# Patient Record
Sex: MALE | Race: WHITE | NOT HISPANIC OR LATINO | URBAN - METROPOLITAN AREA
[De-identification: names, ages, dates, MRNs, and addresses within clinical notes are randomized per-mention and may not be internally consistent; named-entity substitution may affect disease eponyms.]

---

## 2017-02-03 ENCOUNTER — OFFICE VISIT - RIVER FALLS (OUTPATIENT)
Dept: FAMILY MEDICINE | Facility: CLINIC | Age: 57
End: 2017-02-03

## 2017-04-07 ENCOUNTER — OFFICE VISIT - RIVER FALLS (OUTPATIENT)
Dept: FAMILY MEDICINE | Facility: CLINIC | Age: 57
End: 2017-04-07

## 2017-06-20 ENCOUNTER — COMMUNICATION - RIVER FALLS (OUTPATIENT)
Dept: FAMILY MEDICINE | Facility: CLINIC | Age: 57
End: 2017-06-20

## 2017-06-20 ENCOUNTER — OFFICE VISIT - RIVER FALLS (OUTPATIENT)
Dept: FAMILY MEDICINE | Facility: CLINIC | Age: 57
End: 2017-06-20

## 2017-12-11 ENCOUNTER — AMBULATORY - RIVER FALLS (OUTPATIENT)
Dept: FAMILY MEDICINE | Facility: CLINIC | Age: 57
End: 2017-12-11

## 2017-12-12 LAB
CHOLEST SERPL-MCNC: 176 MG/DL
CHOLEST/HDLC SERPL: 1.9 {RATIO}
CREAT SERPL-MCNC: 0.89 MG/DL (ref 0.7–1.33)
GLUCOSE BLD-MCNC: 84 MG/DL (ref 65–99)
HDLC SERPL-MCNC: 92 MG/DL
LDLC SERPL CALC-MCNC: 71 MG/DL
NONHDLC SERPL-MCNC: 84 MG/DL
TRIGL SERPL-MCNC: 53 MG/DL

## 2017-12-15 ENCOUNTER — OFFICE VISIT - RIVER FALLS (OUTPATIENT)
Dept: FAMILY MEDICINE | Facility: CLINIC | Age: 57
End: 2017-12-15

## 2017-12-15 ASSESSMENT — MIFFLIN-ST. JEOR: SCORE: 1666.75

## 2018-06-15 ENCOUNTER — OFFICE VISIT - RIVER FALLS (OUTPATIENT)
Dept: FAMILY MEDICINE | Facility: CLINIC | Age: 58
End: 2018-06-15

## 2018-06-15 ASSESSMENT — MIFFLIN-ST. JEOR: SCORE: 1627.74

## 2018-06-29 ENCOUNTER — OFFICE VISIT - RIVER FALLS (OUTPATIENT)
Dept: FAMILY MEDICINE | Facility: CLINIC | Age: 58
End: 2018-06-29

## 2018-07-31 ENCOUNTER — OFFICE VISIT - RIVER FALLS (OUTPATIENT)
Dept: FAMILY MEDICINE | Facility: CLINIC | Age: 58
End: 2018-07-31

## 2018-09-13 ENCOUNTER — OFFICE VISIT - RIVER FALLS (OUTPATIENT)
Dept: FAMILY MEDICINE | Facility: CLINIC | Age: 58
End: 2018-09-13

## 2018-09-13 ASSESSMENT — MIFFLIN-ST. JEOR: SCORE: 1615.95

## 2022-02-11 VITALS
WEIGHT: 182.4 LBS | SYSTOLIC BLOOD PRESSURE: 146 MMHG | TEMPERATURE: 99.5 F | BODY MASS INDEX: 27.2 KG/M2 | SYSTOLIC BLOOD PRESSURE: 124 MMHG | HEART RATE: 76 BPM | WEIGHT: 183.6 LBS | HEIGHT: 69 IN | DIASTOLIC BLOOD PRESSURE: 84 MMHG | BODY MASS INDEX: 27.11 KG/M2 | SYSTOLIC BLOOD PRESSURE: 120 MMHG | WEIGHT: 184.2 LBS | HEART RATE: 64 BPM | BODY MASS INDEX: 27.02 KG/M2 | HEART RATE: 72 BPM | DIASTOLIC BLOOD PRESSURE: 80 MMHG | TEMPERATURE: 98.8 F | DIASTOLIC BLOOD PRESSURE: 74 MMHG | TEMPERATURE: 98.1 F

## 2022-02-12 VITALS
HEART RATE: 64 BPM | BODY MASS INDEX: 26.63 KG/M2 | DIASTOLIC BLOOD PRESSURE: 80 MMHG | HEIGHT: 69 IN | SYSTOLIC BLOOD PRESSURE: 128 MMHG | WEIGHT: 179.8 LBS | TEMPERATURE: 98.1 F

## 2022-02-12 VITALS
BODY MASS INDEX: 28.29 KG/M2 | DIASTOLIC BLOOD PRESSURE: 88 MMHG | TEMPERATURE: 97.4 F | DIASTOLIC BLOOD PRESSURE: 86 MMHG | HEIGHT: 69 IN | SYSTOLIC BLOOD PRESSURE: 142 MMHG | SYSTOLIC BLOOD PRESSURE: 161 MMHG | HEART RATE: 72 BPM | HEART RATE: 71 BPM | WEIGHT: 191 LBS

## 2022-02-15 NOTE — LETTER
(Inserted Image. Unable to display)       January 10, 2019        MARVIN ROBERTS  [address] [place], [place] [id number]      Dear MARVIN,      Thank you for selecting [facility] for your healthcare needs.     I just received a request to refill your warfarin.  I do think you need to be on warfarin due to your history of significant blood clots, but it is not safe for us to prescribe this medication if you do not come in to get your blood levels checked.  Please come in for an appointment for us to discuss this in detail!  I'm worried about you.          Please contact my practice at [phone] if you have any questions or concerns.     Sincerely,        Ibis Reynolds MD

## 2022-02-15 NOTE — PROGRESS NOTES
Patient:   MARVIN ROBERTS            MRN: [id number]            FIN: [id number]               Age:   58 years     Sex:  Male     :  1960   Associated Diagnoses:   None   Author:   Rodolfo CRANE, Ibis      Procedure   EKG procedure   Indication: chest pain.     Position: supine.     EKG findings   Interpretation: by primary care provider.     Rhythm: sinus.     Axis: normal axis, normal configuration.     Within normal limits.     Intervals: [place] normal, QRS normal, QT normal.     Normal EKG.     P waves: normal.     QRS complex: normal, Q waves present unchanged since  2016.     ST-T-U complex: normal, non-specific ST-T wave abnormalities, peaked T waves, no depressed ST segment.     Interpretation: borderline, ST-T wave abnormalities No acute ST changes, unchanged from previous EKG.     Discussed: with patient.        Impression and Plan   Orders

## 2022-02-15 NOTE — PROGRESS NOTES
Patient:   MARVIN ROBERTS            MRN: [id number]            FIN: [id number]               Age:   57 years     Sex:  Male     :  1960   Associated Diagnoses:   Anticoagulated; DVT of lower extremity, bilateral; HTN (Hypertension); Personal history of DVT (deep vein thrombosis)   Author:   Jayro Prajapati MD      Visit Information      Date of Service: 12/15/2017 02:33 pm  Performing Location: [facility]  Encounter#: [id number]      Primary Care Provider (PCP):  [id number] -UNKNOWN,    NPI# [id number]      Referring Provider:  Jayro Prajapati MD    NPI# [id number]      Chief Complaint   12/15/2017 2:38 PM CST   Patient here for Hypertension Check.        History of Present Illness   In for fu  Meds as directed  tremor controlled  Works in kitchen  Chronic edema right leg since dvts Better in am worse at night  No chest pain sob abd pain      Review of Systems   Constitutional:  Negative except as documented in history of present illness.    Ear/Nose/Mouth/Throat:  Negative.    Respiratory:  Negative.    Cardiovascular:  Negative.    Gastrointestinal:  Negative.    Genitourinary:  Negative.    Musculoskeletal:  Negative except as documented in history of present illness.    Integumentary:  Negative except as documented in history of present illness.    Neurologic:  Negative.             Health Status   Allergies:    Allergic Reactions (Selected)  No known allergies   Medications:  (Selected)   Prescriptions  Prescribed  aspirin 81 mg oral tablet: 1 tab(s) ( 81 mg ), po, daily, # 30 tab(s), 0 Refill(s), Type: Maintenance, patient has supply at home (Rx)  citalopram 20 mg oral tablet: See Instructions, Instructions: TAKE ONE TABLET BY MOUTH EVERY DAY, # 90 unknown unit, 1 Refill(s), Type: Soft Stop, Pharmacy: [facility]  losartan 25 mg oral tablet: 1 tab(s) ( 25 mg ), PO, Daily, # 90 tab(s), 3 Refill(s), Type: Maintenance, [facility], 1 tab(s) po  daily  propranolol 10 mg oral tablet: 1 tab(s) ( 10 mg ), po, bid, # 180 tab(s), 1 Refill(s), Type: Maintenance, [place] [facility] [place] tab(s) po bid  warfarin 2.5 mg oral tablet: See Instructions, Instructions: TAKE ONE TABLET BY MOUTH EVERY DAY, # 90 unknown unit, Type: Soft Stop, [place] [facility] [place]ed Medications  Documented  ferrous sulfate 325 mg (65 mg elemental iron) oral tablet: 1 tab(s) ( 325 mg ), po, bid, 0 Refill(s), Type: Maintenance   Problem list:    All Problems (Selected)  Anemia due to blood loss / SNOMED CT 0715277643 / Confirmed  Anticoagulated / ICD-9-CM V58.61 / Confirmed  Anxiety Disorder / ICD-9-.00 / Confirmed  Benign Familial Tremor / ICD-9-.1 / Confirmed  Chewing tobacco use / SNOMED CT 245873377 / Confirmed  DVT of lower extremity, bilateral / SNOMED CT 2273853443 / Confirmed  Personal history of DVT (deep vein thrombosis) / SNOMED CT 7883007093 / Confirmed  Personal history of pulmonary embolism / SNOMED CT 949384530 / Confirmed  History of gastrointestinal bleed / SNOMED CT 9622599569 / Confirmed  HTN (Hypertension) / ICD-9-.9 / Confirmed  Deep vein thrombosis of portal vein / SNOMED CT 99846920 / Confirmed  Bilateral pulmonary embolism / SNOMED CT 80854567 / Confirmed  PVT (Portal Vein Thrombosis) / ICD-9- / Confirmed      Histories   Past Medical History:    Active  Chewing tobacco use (738866850)  History of gastrointestinal bleed (1301529258)  Anemia due to blood loss (6883637080)  Resolved  *Hospitalized@[facility] - DVT of lower extremity, bilateral: Onset on 12/3/2015 at 55 years.  Resolved on 12/3/2015 at 55 years.  *Hospitalized@[facility] - Pulmonary emboli B/L: Onset on 12/3/2015 at 55 years.  Resolved on 12/5/2015 at 55 years.  Inpatient stay (059043341):  Resolved on 8/23/2016 at 56 years.  Comments:  9/12/2016 CDT 6:40 AM CDT - Yael Myers  [facility] - GI blood loss and anemia   Family History:     Cerebrovascular accident  Father (Johnathan)  Comments:  5/19/2010 3:38 PM - Halie Gomez  CVA at age 76     Procedure history:    Colonoscopy (SNOMED CT 249102859) performed by Rico Fraser MD on 8/15/2016 at 56 Years.  Esophagogastroduodenoscopy (SNOMED CT 979469851) performed by Collins Hayden MD on 1/4/2002 at 41 Years.  Tonsillectomy and adenoidectomy (SNOMED CT 511687732).  Eye surgery (SNOMED CT 7696463647).   Social History:        Alcohol Assessment: Current            Current                     Comments:                      05/19/2010 - Halie Gomez                     One case of beer/weekend      Tobacco Assessment: Current            Current                     Comments:                      05/19/2010 - Halie Gomez                     Oral tobacco        Physical Examination   Vital Signs   12/15/2017 2:38 PM CST Temperature Tympanic 97.4 DegF  LOW    Peripheral Pulse Rate 71 bpm    Pulse Site Brachial artery    HR Method Electronic    Systolic Blood Pressure 161 mmHg  HI    Diastolic Blood Pressure 88 mmHg  HI    Mean Arterial Pressure 112 mmHg    BP Site Right arm    BP Method Electronic      Measurements from flowsheet : Measurements   12/15/2017 2:38 PM CST Height Measured - Standard 69 in    Weight Measured - Standard 191.0 lb    BSA 2.05 m2    Body Mass Index 28.2 kg/m2      General:  Alert and oriented, No acute distress.    Neck:  Supple, Non-tender.    Respiratory:  Lungs are clear to auscultation, Respirations are non-labored.    Cardiovascular:  Normal rate, Regular rhythm.    Gastrointestinal:  Soft, Non-tender, No organomegaly.    Musculoskeletal:  2 pl stasis edema r leg with stasis skin changes  no tenderness.    Neurologic:  Alert, Oriented.       Impression and Plan   Diagnosis     Anticoagulated (XGW28-GT Z79.01).     DVT of lower extremity, bilateral (OMB82-VC I82.403).     HTN (Hypertension) (PBX00-YN I10).     Personal history of DVT (deep vein thrombosis)  (QJZ02-QX Z86.718).     Course:  Not progressing as expected.    Plan:  add arb  labs/bp in 1 month  6 mo fu  support stockings  Recent low inr now coumadin increased  he notes no new sxs.    Patient Instructions:       Counseled: Patient, Regarding diagnosis, Regarding treatment, Regarding medications, Diet, Activity.

## 2022-02-15 NOTE — PROGRESS NOTES
Chief Complaint    Patient is here for follow up with right leg.  History of Present Illness      rash started following his DVT several years ago, he has completed his repeat lower ext US showing bilateral extensive DVT with collaterals and recanalization.  His ABIs are normal.       tried some over the counter creams without relief, but says the non-stick gauze is helpful.      His swelling is always worse in the afternoon after he has been up for awhile.  His INR today is above his therapeutic range at 3.6.  He has been taking 2.5 mg of Coumadin daily.       no fevers, chills, sore throat, runny nose, nausea, vomiting, constipation, no new skin lesions, chest pain, palpitations, slurred speech, new paresthesia, shortness of breath or wheeze.      Review of systems is negative except as per HPI      Exam:      General: alert and oriented ×3 no acute distress.      HEENT: pupils are equal round and reactive to light extraocular motion is intact. Normocephalic and atraumatic.       Hearing is grossly normal and there is no otorrhea.       Nares are patent there is no rhinorrhea.       Mucous membranes are moist and pink.      Chest: has bilateral rise with no increased work of breathing.      Cardiovascular: normal perfusion and brisk capillary refill.      Musculoskeletal: right lower ext edema with significant venous stasis ulcers, also see images captured today.      Neuro: no gross focal abnormalities and memory seems intact.      Psychiatric: speech is clear and coherent and fluent. Patient dressed appropriately for the weather. Mood is appropriate and affect is full.      Skin:  almost circumferential ulcer near right ankle, also some additional ecchymosis front medial calf,      also see images captured         Physical Exam   Vitals & Measurements    T: 98.8   F (Tympanic)  HR: 72(Peripheral)  BP: 124/74     WT: 184.2 lb   Assessment/Plan       Anticoagulated (Z79.01)         Orders:          95027 office  outpatient visit 40 minutes (Charge), Quantity: 1, Ulcer, venous stasis  DVT of lower extremity, bilateral  Bilateral pulmonary embolism  Anticoagulated  Deep vein thrombosis of portal vein          POC Prothrombin Time with INR* ([facility]), Specimen Type: Fingerstick, Collection Date: 06/29/18 14:42:00 CDT          Referral (Request), 06/29/18 15:18:00 CDT, Referred to: Other, Referred to: Dr. NIURKA Villegas or FLAKITA Macario with [facility] phone number [phone], Bilateral pulmonary embolism  Deep vein thrombosis of portal vein  DVT of lower extremity, bilateral  Ulcer, venous...          Referral (Request), 06/29/18 15:18:00 CDT, Referred to: Hematology, Bilateral pulmonary embolism  Deep vein thrombosis of portal vein  DVT of lower extremity, bilateral  Ulcer, venous stasis  Anticoagulated                Bilateral pulmonary embolism (I26.99)         Orders:          86463 office outpatient visit 40 minutes (Charge), Quantity: 1, Ulcer, venous stasis  DVT of lower extremity, bilateral  Bilateral pulmonary embolism  Anticoagulated  Deep vein thrombosis of portal vein          Referral (Request), 06/29/18 15:18:00 CDT, Referred to: Other, Referred to: Dr. NIURKA Villegas or FLAKITA Macario with [facility] phone number [phone], Bilateral pulmonary embolism  Deep vein thrombosis of portal vein  DVT of lower extremity, bilateral  Ulcer, venous...          Referral (Request), 06/29/18 15:18:00 CDT, Referred to: Hematology, Bilateral pulmonary embolism  Deep vein thrombosis of portal vein  DVT of lower extremity, bilateral  Ulcer, venous stasis  Anticoagulated                Deep vein thrombosis of portal vein (I81)         Orders:          04692 office outpatient visit 40 minutes (Charge), Quantity: 1, Ulcer, venous stasis  DVT of lower extremity, bilateral  Bilateral pulmonary embolism  Anticoagulated  Deep vein thrombosis of portal vein          Referral (Request), 06/29/18 15:18:00 CDT,  Referred to: Other, Referred to: Dr. NIURKA Villegas or FLAKITA Macario with [facility] phone number [phone], Bilateral pulmonary embolism  Deep vein thrombosis of portal vein  DVT of lower extremity, bilateral  Ulcer, venous...          Referral (Request), 06/29/18 15:18:00 CDT, Referred to: Hematology, Bilateral pulmonary embolism  Deep vein thrombosis of portal vein  DVT of lower extremity, bilateral  Ulcer, venous stasis  Anticoagulated                DVT of lower extremity, bilateral (I82.403)         Orders:          86269 office outpatient visit 40 minutes (Charge), Quantity: 1, Ulcer, venous stasis  DVT of lower extremity, bilateral  Bilateral pulmonary embolism  Anticoagulated  Deep vein thrombosis of portal vein          Referral (Request), 06/29/18 15:18:00 CDT, Referred to: Other, Referred to: Dr. NIURKA Villegas or FLAKITA Macario with [facility] phone number [phone], Bilateral pulmonary embolism  Deep vein thrombosis of portal vein  DVT of lower extremity, bilateral  Ulcer, venous...          Referral (Request), 06/29/18 15:18:00 CDT, Referred to: Hematology, Bilateral pulmonary embolism  Deep vein thrombosis of portal vein  DVT of lower extremity, bilateral  Ulcer, venous stasis  Anticoagulated                Ulcer, venous stasis (I83.213)         Orders:          37857 office outpatient visit 40 minutes (Charge), Quantity: 1, Ulcer, venous stasis  DVT of lower extremity, bilateral  Bilateral pulmonary embolism  Anticoagulated  Deep vein thrombosis of portal vein          Referral (Request), 06/29/18 15:18:00 CDT, Referred to: Other, Referred to: Dr. NIURKA Macario with [facility] phone number [phone], Bilateral pulmonary embolism  Deep vein thrombosis of portal vein  DVT of lower extremity, bilateral  Ulcer, venous...          Referral (Request), 06/29/18 15:18:00 CDT, Referred to: Hematology, Bilateral pulmonary embolism  Deep vein thrombosis of portal  vein  DVT of lower extremity, bilateral  Ulcer, venous stasis  Anticoagulated               I've spoken with PT here at [facility] and they will be able to start helping patient today with wound care.  also spoke with [facility] and learned that patient may be a candidate for having some of the clot in the right leg lysed.  Explained to patient that the left leg also has clot present but has developed better recanalization so he does not have the same symptoms.  40 minutes spent with patient in direct face to face contact, > 50% of time spent counseling and coordinating care.   Patient Information     Name:MARVIN ROBERTS      Address:      [address] [place] [id number]     Sex:Male     YOB: 1960     [phone]     Emergency Contact:HEMA ROBERTS     MRN:[id number]     FIN:[id number]     Location:[facility]     Date of Service:06/29/2018      Primary Care Physician:       Jayro Prajapati MD, [phone]      Attending Physician:       Rodolfo CRANE Ibis, [phone]  Problem List/Past Medical History    Ongoing     Anemia due to blood loss     Anticoagulated     Anxiety Disorder     Benign Familial Tremor     Bilateral pulmonary embolism     Chewing tobacco use     Deep vein thrombosis of portal vein     DVT of lower extremity, bilateral     History of gastrointestinal bleed     HTN (Hypertension)     Personal history of DVT (deep vein thrombosis)     Personal history of pulmonary embolism     PVT (Portal Vein Thrombosis)     Ulcer, venous stasis    Historical     *Hospitalized@[facility] - DVT of lower extremity, bilateral     *Hospitalized@[facility] - Pulmonary emboli B/L     Inpatient stay       Comments: [facility] - GI blood loss and anemia  Procedure/Surgical History     Colonoscopy (08/15/2016)           Esophagogastroduodenoscopy (01/04/2002)           Eye surgery           Tonsillectomy and adenoidectomy        Medications     aspirin 81 mg oral  tablet: 81 mg, 1 tab(s), po, daily, 30 tab(s).     losartan 25 mg oral tablet: 25 mg, 1 tab(s), PO, Daily, 90 tab(s), 3 Refill(s).     citalopram 20 mg oral tablet: 20 mg, 1 tab(s), po, daily, 90 tab(s), 1 Refill(s).     propranolol 10 mg oral tablet: 10 mg, 1 tab(s), po, bid, 180 tab(s), 1 Refill(s).     warfarin 2.5 mg oral tablet: See Instructions, TAKE ONE TABLET BY MOUTH EVERY DAY, 90 unknown unit, 1 Refill(s).          Allergies    No known allergies  Social History    Smoking Status - 06/29/2018     Current some day smoker     Alcohol - Current, 05/19/2010      Current, 05/19/2010     Tobacco - Current, 05/19/2010      Current, 05/19/2010  Family History    Cerebrovascular accident: Father.    Mother: History is negative  Immunizations      Vaccine Date Status      influenza virus vaccine, inactivated 10/28/2014 Given      influenza virus vaccine, inactivated 10/09/2013 Given      tetanus/diphth/pertuss (Tdap) adult/adol 03/17/2009 Recorded  Lab Results       Lab Results (Last 4 results within 90 days)        Protime: 35.2 High [11.9  - 13.9] (06/29/18 15:09:00 CDT)       Protime: 24.4 High [11.9  - 13.9] (06/15/18 15:24:00 CDT)       INR: 3.6 High (06/29/18 15:09:00 CDT)       INR: 2.3 High (06/15/18 15:24:00 CDT)

## 2022-02-15 NOTE — TELEPHONE ENCOUNTER
---------------------  From: Ibis Reynolds MD   To: Phone Messages Pool ([id number]_[facility]); [employer] Message Pool ([id number] [facility] [place]);     Sent: 1/10/2019 10:29:06 PM CST  Subject: General Message     please invite patient to come in for an appointment to discuss his need for anticoagulation.LVMFCB @ [id number] @ [id number]---------------------  From: Viviane Sullivan CMA (Phone Messages Pool ([id number]_[facility]))   To: [employer] Message Pool ([id number]_[place] [facility] [place]);     Sent: 1/15/2019 9:44:06 AM CST  Subject: FW: General Message     Unable to reach patient.---------------------  From: Yoli Laguna CMA ([employer] Message Pool ([id number]_[facility]))   To: Ibis Reynolds MD;     Sent: 1/15/2019 9:46:25 AM CST  Subject: FW: General Message     unable to reach patient, your letter was sent.

## 2022-02-15 NOTE — PROGRESS NOTES
Patient:   MARVIN ROBERTS            MRN: [id number]            FIN: [id number]               Age:   58 years     Sex:  Male     :  1960   Associated Diagnoses:   Ulcer, venous stasis; Preop examination; History of gastrointestinal bleed; DVT of lower extremity, bilateral; Deep vein thrombosis of portal vein; Chewing tobacco use; Bilateral pulmonary embolism; Anticoagulated   Author:   Ibis Reynolds MD      Preoperative Information   Indication for surgery:  Cardiovascular disorder.    Source of history:  Self, Medical record.           Chief Complaint   2018 4:00 PM CDT    pre-op R leg blood clot, Dr. Villegas at [facility]. Also needs Rx refill.      Review of Systems   Constitutional:  Negative except as documented in history of present illness.    Eye:  Negative except as documented in history of present illness.    Ear/Nose/Mouth/Throat:  Negative except as documented in history of present illness.    Respiratory:  Negative except as documented in history of present illness.    Cardiovascular:  Negative except as documented in history of present illness.    Gastrointestinal:  Negative except as documented in history of present illness.    Immunologic:  Negative except as documented in history of present illness.    Integumentary:  Negative except as documented in history of present illness.              Health Status   Allergies:    Allergic Reactions (Selected)  No known allergies   Medications:  (Selected)   Prescriptions  Prescribed  aspirin 81 mg oral tablet: 1 tab(s) ( 81 mg ), po, daily, # 30 tab(s), 0 Refill(s), Type: Maintenance, patient has supply at home (Rx)  citalopram 20 mg oral tablet: = 1 tab(s) ( 20 mg ), po, daily, # 90 tab(s), 1 Refill(s), Type: Soft Stop, [facility], 1 tab(s) Oral daily  losartan 25 mg oral tablet: = 1 tab(s) ( 25 mg ), PO, Daily, # 90 tab(s), 3 Refill(s), Type: Maintenance, [facility], 1 tab(s) Oral  daily  propranolol 10 mg oral tablet: = 1 tab(s) ( 10 mg ), po, bid, # 180 tab(s), 3 Refill(s), Type: Maintenance, [place] [facility] [place] Oral bid  warfarin 2.5 mg oral tablet: See Instructions, Instructions: TAKE ONE TABLET BY MOUTH EVERY DAY, # 90 EA, 1 Refill(s), Type: Maintenance, [place] [facility] [place] TAKE ONE TABLET BY MOUTH EVERY DAY   Problem list:    All Problems  Anemia due to blood loss / SNOMED CT 3508568032 / Confirmed  Anticoagulated / ICD-9-CM V58.61 / Confirmed  Anxiety Disorder / ICD-9-.00 / Confirmed  Benign Familial Tremor / ICD-9-.1 / Confirmed  Bilateral pulmonary embolism / SNOMED CT 94999631 / Confirmed  Chewing tobacco use / SNOMED CT 061851724 / Confirmed  Deep vein thrombosis of portal vein / SNOMED CT 99710351 / Confirmed  DVT of lower extremity, bilateral / SNOMED CT 3625430426 / Confirmed  History of gastrointestinal bleed / SNOMED CT 0748850134 / Confirmed  HTN (Hypertension) / ICD-9-.9 / Confirmed  Personal history of DVT (deep vein thrombosis) / SNOMED CT 6384922404 / Confirmed  Personal history of pulmonary embolism / SNOMED CT 104489688 / Confirmed  PVT (Portal Vein Thrombosis) / ICD-9- / Confirmed  Ulcer, venous stasis / SNOMED CT 6251450535 / Confirmed  Resolved: *Hospitalized@[facility] - DVT of lower extremity, bilateral  Resolved: *Hospitalized@[facility] - Pulmonary emboli B/L  Resolved: Inpatient stay / SNOMED CT 539318640      Histories   Past Medical History:    Active  Chewing tobacco use (425158881)  History of gastrointestinal bleed (7111734648)  Anemia due to blood loss (4575533035)  Resolved  *Hospitalized@[facility] - DVT of lower extremity, bilateral: Onset on 12/3/2015 at 55 years.  Resolved on 12/3/2015 at 55 years.  *Hospitalized@[facility] - Pulmonary emboli B/L: Onset on 12/3/2015 at 55 years.  Resolved on 12/5/2015 at 55 years.  Inpatient stay (985507112):  Resolved on 8/23/2016 at 56  years.  Comments:  9/12/2016 CDT 6:40 AM CDT - Yael Myers  [facility] - GI blood loss and anemia   Family History:    Cerebrovascular accident  Father (Johnathan)  Comments:  5/19/2010 3:38 PM - Halie Gomez  CVA at age 76     Procedure history:    Colonoscopy (127752197) on 8/15/2016 at 56 Years.  Esophagogastroduodenoscopy (219656581) on 1/4/2002 at 41 Years.  Tonsillectomy and adenoidectomy (293456828).  Eye surgery (4587446081).   Social History:        Alcohol Assessment: Current            Current                     Comments:                      05/19/2010 - Halie Gomez                     One case of beer/weekend      Tobacco Assessment: Current            Current                     Comments:                      05/19/2010 - Halie Gomez                     Oral tobacco       Has no history of anemia.  Has a history of DVT or pulmonary embolism.  Has no personal history of bleeding problems.   Has no personal or family history of anesthesia reactions.  Patient does not have active tuberculosis.    he has  taken aspirin or aspirin containing products in the last week.     he has not taken Plavix (Clopidogrel) in the last 2 weeks.    he has taken warfarin in the past week.    he has not been on corticosteroids for more than 2 weeks recently.      Chest pain / SOB walking up 2 flights of steps? n  Pain in neck or jaw?n  CAD MI?  y  Afib? n  Heart Failure?n  Asthma  or Bronchitis? n  Diabetes? n       Insulin/Orals?   Seizure Disorder?n   CKD?n  Thyroid Disease?n  Liver Diseasen  CVA?n         Physical Examination   Vital Signs   9/13/2018 4:00 PM CDT Temperature Tympanic 98.1 DegF    Peripheral Pulse Rate 64 bpm    Pulse Site Radial artery    HR Method Manual    Systolic Blood Pressure 128 mmHg    Diastolic Blood Pressure 80 mmHg    Mean Arterial Pressure 96 mmHg    BP Site Right arm    BP Method Manual      Measurements from flowsheet : Measurements   9/13/2018 4:00 PM CDT Height Measured - Standard  69 in    Weight Measured - Standard 179.8 lb    BSA 1.99 m2    Body Mass Index 26.55 kg/m2  HI      General:  Alert and oriented, No acute distress.    Eye:  Pupils are equal, round and reactive to light, Extraocular movements are intact, Normal conjunctiva.    HENT:  Normocephalic, Tympanic membranes are clear, Oral mucosa is moist, No pharyngeal erythema.    Neck:  Supple, Non-tender, No jugular venous distention, No lymphadenopathy, No thyromegaly.    Respiratory:  Lungs are clear to auscultation, Respirations are non-labored, Breath sounds are equal, Symmetrical chest wall expansion, No chest wall tenderness.    Cardiovascular:  Normal rate, Regular rhythm, No murmur, No gallop, Normal peripheral perfusion.    Gastrointestinal:  Soft, Non-tender, Non-distended, Normal bowel sounds.    Musculoskeletal:  Normal range of motion, No deformity, Normal gait.    right lower ext has dressing in place, + edema and skin changes c/w venous stasis   Integumentary:  Warm, Dry, No rash.    Neurologic:  Alert, Oriented, CN 2-12 grossly intact.    Psychiatric:  Cooperative, Appropriate mood & affect, Normal judgment.       Review / Management         Results review:  Lab results   7/31/2018 4:22 PM CDT WBC 8.0    RBC 4.67    Hgb 13.6 g/dL    Hct 42.3 %    MCV 91 fL    MCH 29.1 pg    MCHC 32.2 g/dL    RDW 16.4 %    Platelet 199    MPV 10.5 fL    Lymphs 17.0 %    Abs Lymphs 1.4    Neutrophils 61.0 %    Abs Neutrophils 4.9    Monocytes 13.0 %    Abs Monocytes 1.0    Eosinophils 8.4 %    Abs Eosinophils 0.7    Basophils 0.6 %    Abs Basophils 0.1    Sed Rate 24 mm/hr    Protime 12.9    INR 1.0   6/29/2018 3:09 PM CDT Protime 35.2    INR 3.6   6/29/2018 2:45 PM CDT INR 5.9  HI    Prothrombin Time 71.2  HI   6/15/2018 3:24 PM CDT Protime 24.4    INR 2.3   .       Impression and Plan   Diagnosis     Ulcer, venous stasis (RYH33-CB I83.213).     Preop examination (CAT68-UI Z01.818).     History of gastrointestinal bleed (APG27-ML  Z87.19).     DVT of lower extremity, bilateral (HYI12-YV I82.403).     Deep vein thrombosis of portal vein (UMW48-ON I81).     Chewing tobacco use (JSI86-UO Z72.0).     Bilateral pulmonary embolism (XCZ73-NS I26.99).     Anticoagulated (KSL87-EF Z79.01).     Condition:  Stable, Pt counseled to d/c chewing tobacco until after procedure.   His INR is therapeutic.  He does have an abnml EKG but no new changes, and will be having procedure done with cards so will leave cardiac clearance to his cardiologist.  .

## 2022-02-15 NOTE — PROGRESS NOTES
Chief Complaint    F/u ER- bleeding from varicose vein right ankle  History of Present Illness      Patient presents to clinic today for follow-up from his recent trip to the emergency room.  Approximately 2 years ago he was diagnosed with bilateral lower extremity DVTs.  He reports that he started to develop wound on his right lower extremity approximately 1 year ago.  He had been using some creams on it and thought that he would try to use just some gauze without any creams.  On Tuesday he removed a piece of gauze and it ripped away some of his skin and he developed some uncontrolled bleeding.  He went to the emergency room and was found that his INR was at 4.4.  He was told that he had torn a varicose vein.  He was instructed to decrease his warfarin to half a tablet daily of his 2.5 mg tablet.  Patient reports that prior to this he had been taking 2.5 mg p.o. daily of his warfarin.  Chart review shows that the last time he had his INR checked here in clinic was in December 2017.  Patient reports that he usually uses some type of a cream and some gauze.  He thinks that the ulcer has been getting worse.  The emergency room also told him that he might be getting a fungal infection on part of the lesions.  Today in clinic revealed pretty significant venous stasis ulcer and then a more proximal annular shaped lesion that was approximately 6 cm in diameter.  Also see image captured today in the media tab.  Discussed case with some colleagues due to its complexity.  Was able to have Torres from the [facility] physical therapy department come over to take a look at the ulcer also.  Sarahy is involved in the wound clinic there.  He felt like the wound clinic at the [facility] would be able to assist him.  Prior to referring patient to the wound clinic it would be appropriate to confirm that he is getting adequate arterial flow to his right lower extremity and reexamined the clot burden to his  right leg.       He reports that usually the leg does not hurt.  Sometimes it does get a little bit achy when the gauze rubs against the wound or if he does a lot of walking.        [facility] were able to assist patient in getting the wound cleaned up and dressed.  Wound care instructions were carefully written out for the patient.  Review of Systems      Negative except as per HPI  Physical Exam   Vitals & Measurements    T: 99.5   F (Tympanic)  HR: 76(Peripheral)  BP: 146/84     HT: 69 in  WT: 182.4 lb  BMI: 26.93       Review of systems is negative except as per HPI      Exam:      General: alert and oriented ×3 no acute distress.      HEENT: pupils are equal round and reactive to light extraocular motion is intact. Normocephalic and atraumatic.       Hearing is grossly normal and there is no otorrhea.       Nares are patent there is no rhinorrhea.       Mucous membranes are moist and pink.      Chest: has bilateral rise with no increased work of breathing.      Cardiovascular: The right lower extremity is dusky and blue with a delayed capillary refill.  Difficult to feel pulses.      Musculoskeletal: no gross focal abnormalities and normal gait      Neuro: no gross focal abnormalities and memory seems intact except as noted in the skin exam      Psychiatric: speech is clear and coherent and fluent. Patient dressed appropriately for the weather. Mood is appropriate and affect is full.      Skin also see the images captured today.  Right lower extremity has significant ecchymosis, there is venous stasis, there is an almost circumferential ulcer present, there is a lot of skin sloughing present.  There is no odor and no evidence of purulent drainage.         Assessment/Plan       Anticoagulated (Z79.01)         Orders:          PT (Request), Priority: Urgent, Anticoagulated          Return to Clinic (Request), RFV: review JAIRON and us of right leg, consider referral to wound clinic, Return in 1 week                DVT of  lower extremity, bilateral (I82.403)         Orders:          Return to Clinic (Request), RFV: review JAIRON and us of right leg, consider referral to wound clinic, Return in 1 week          US Lower Extremity Venous Doppler (Request), Priority: Routine, Instructions: Specify Right or Left, Leg ulcer  DVT of lower extremity, bilateral          US Lower Extremity Venous Doppler Bilateral (Request), Priority: Routine, DVT of lower extremity, bilateral                Leg ulcer (L97.909)        Would like patient to continue with his wound care as instructed, to try to get the JAIRON and ultrasound done within the next week so that he can return to clinic for us to review the results.  Anticipate referral to wound clinic as long as his JAIRON shows adequate blood flow.  However, if his JAIRON is abnormal then we will plan to place referral to a vascular surgeon.         Orders:          Peripheral Arterial (Request), Priority: Routine, Leg ulcer          Return to Clinic (Request), RFV: review JAIRON and us of right leg, consider referral to wound clinic, Return in 1 week          US Lower Extremity Venous Doppler (Request), Priority: Routine, Instructions: Specify Right or Left, Leg ulcer  DVT of lower extremity, bilateral           Patient Information     Name:MARVIN ROBERTS ARJUN      Address:      [address] [place] [id number]     Sex:Male     YOB: 1960     [phone]     Emergency Contact:HEMA ROBERTS     MRN:[id number]     FIN:[id number]     Location:[facility]     Date of Service:06/15/2018      Primary Care Physician:       Jayro Prajapati MD, [phone]      Attending Physician:       Ibis Reynolds MD, [phone]  Problem List/Past Medical History    Ongoing     Anemia due to blood loss     Anticoagulated     Anxiety Disorder     Benign Familial Tremor     Bilateral pulmonary embolism     Chewing tobacco use     Deep vein thrombosis of portal vein     DVT of  lower extremity, bilateral     History of gastrointestinal bleed     HTN (Hypertension)     Personal history of DVT (deep vein thrombosis)     Personal history of pulmonary embolism     PVT (Portal Vein Thrombosis)    Historical     *Hospitalized@[facility] - DVT of lower extremity, bilateral     *Hospitalized@[place] - Pulmonary emboli B/L     Inpatient stay       Comments: [facility] - GI blood loss and anemia  Procedure/Surgical History     Colonoscopy (08/15/2016)           Esophagogastroduodenoscopy (01/04/2002)           Eye surgery           Tonsillectomy and adenoidectomy        Medications     aspirin 81 mg oral tablet: 81 mg, 1 tab(s), po, daily, 30 tab(s).     losartan 25 mg oral tablet: 25 mg, 1 tab(s), PO, Daily, 90 tab(s), 3 Refill(s).     citalopram 20 mg oral tablet: 20 mg, 1 tab(s), po, daily, 90 tab(s), 1 Refill(s).     propranolol 10 mg oral tablet: 10 mg, 1 tab(s), po, bid, 180 tab(s), 1 Refill(s).     warfarin 2.5 mg oral tablet: See Instructions, TAKE ONE TABLET BY MOUTH EVERY DAY, 90 unknown unit, 1 Refill(s).          Allergies    No known allergies  Social History    Smoking Status - 06/15/2018     Former smoker     Alcohol - Current, 05/19/2010      Current, 05/19/2010     Tobacco - Current, 05/19/2010      Current, 05/19/2010  Family History    Cerebrovascular accident: Father.    Mother: History is negative  Immunizations      Vaccine Date Status      influenza virus vaccine, inactivated 10/28/2014 Given      influenza virus vaccine, inactivated 10/09/2013 Given      tetanus/diphth/pertuss (Tdap) adult/adol 03/17/2009 Recorded

## 2022-02-15 NOTE — PROGRESS NOTES
Chief Complaint    came from wound care- right foot and ankle. PT wanted MD to look at it.  History of Present Illness      rash started      tried some over the counter creams without relief      no recent trips, no changes in laundry products or cleaning products,   no  new pets,   no contacts with people with similar condition,  no new lotions or creams,   no recent camping trips      it itches      it is spreading locally and generalized      no fevers, chills, sore throat, runny nose, nausea, vomiting, constipation, no new skin lesions, chest pain, palpitations, slurred speech, new paresthesia, shortness of breath or wheeze.      Review of systems is negative except as per HPI      Exam:      General: alert and oriented ×3 no acute distress.      HEENT: pupils are equal round and reactive to light extraocular motion is intact. Normocephalic and atraumatic.       Hearing is grossly normal and there is no otorrhea.       Nares are patent there is no rhinorrhea.       Mucous membranes are moist and pink.      Chest: has bilateral rise with no increased work of breathing.      Cardiovascular: normal perfusion and brisk capillary refill.      Musculoskeletal: no gross focal abnormalities and normal gait.      Neuro: no gross focal abnormalities and memory seems intact.      Psychiatric: speech is clear and coherent and fluent. Patient dressed appropriately for the weather. Mood is appropriate and affect is full.      Skin:  also see images captured today, right leg very swollen, weeping clear fluid, papules vs petechia present bilateral lower ext right>left      Education:  discussed with patient diagnosis.    also see assessment and plan below.   Patient should return to clinic if symptoms worsen or do not improve, and as needed for next annual exam.   Physical Exam   Vitals & Measurements    T: 98.1   F (Tympanic)  HR: 64(Peripheral)  BP: 120/80     HT: 69 in  WT: 183.6 lb   Assessment/Plan       Anticoagulated  (Z79.01)         Orders:          53627 office outpatient visit 15 minutes (Charge), Quantity: 1, Rash  Anticoagulated  DVT of lower extremity, bilateral          CBC w/ Diff/Plt (Request), Priority: Urgent, Anticoagulated  Rash          POC Prothrombin Time with INR* ([facility]), Specimen Type: Fingerstick, Collection Date: 06/29/18 14:42:00 CDT          PT (Request), Priority: Urgent, DVT of lower extremity, bilateral  Anticoagulated          Sedimentation Rate, Westergren (Request), Priority: Urgent, Anticoagulated  Rash                DVT of lower extremity, bilateral (I82.403)         Orders:          31608 office outpatient visit 15 minutes (Charge), Quantity: 1, Rash  Anticoagulated  DVT of lower extremity, bilateral          PT (Request), Priority: Urgent, DVT of lower extremity, bilateral  Anticoagulated                Rash (R21)          would like to monitor for now, rtc if sx worsen or do not improve, wouldl flash to check INR to see if he is supratherapeutic, he does not want to wait around for results so we will call him later tonight.         Orders:          66087 office outpatient visit 15 minutes (Charge), Quantity: 1, Rash  Anticoagulated  DVT of lower extremity, bilateral          CBC w/ Diff/Plt (Request), Priority: Urgent, Anticoagulated  Rash          Sedimentation Rate, Westergren (Request), Priority: Urgent, Anticoagulated  Rash           Patient Information     Name:MARVIN ROBERTS      Address:      [address] [place] [id number]     Sex:Male     YOB: 1960     [phone]     Emergency Contact:HEMA ROBERTS     MRN:[id number]     FIN:[id number]     Location:[facility]     Date of Service:07/31/2018      Primary Care Physician:       Jayro Prajapati MD, [phone]      Attending Physician:       Ibis Reynolds MD, [phone]  Problem List/Past Medical History    Ongoing     Anemia due to blood loss     Anticoagulated      Anxiety Disorder     Benign Familial Tremor     Bilateral pulmonary embolism     Chewing tobacco use     Deep vein thrombosis of portal vein     DVT of lower extremity, bilateral     History of gastrointestinal bleed     HTN (Hypertension)     Personal history of DVT (deep vein thrombosis)     Personal history of pulmonary embolism     PVT (Portal Vein Thrombosis)     Ulcer, venous stasis    Historical     *Hospitalized@[facility] - DVT of lower extremity, bilateral     *Hospitalized@[place] - Pulmonary emboli B/L     Inpatient stay       Comments: [facility] - GI blood loss and anemia  Procedure/Surgical History     Colonoscopy (08/15/2016)           Esophagogastroduodenoscopy (01/04/2002)           Eye surgery           Tonsillectomy and adenoidectomy        Medications     aspirin 81 mg oral tablet: 81 mg, 1 tab(s), po, daily, 30 tab(s).     losartan 25 mg oral tablet: 25 mg, 1 tab(s), PO, Daily, 90 tab(s), 3 Refill(s).     citalopram 20 mg oral tablet: 20 mg, 1 tab(s), po, daily, 90 tab(s), 1 Refill(s).     propranolol 10 mg oral tablet: 10 mg, 1 tab(s), po, bid, 180 tab(s), 1 Refill(s).     warfarin 2.5 mg oral tablet: See Instructions, TAKE ONE TABLET BY MOUTH EVERY DAY, 90 unknown unit, 1 Refill(s).          Allergies    No known allergies  Social History    Smoking Status - 07/31/2018     Light tobacco smoker     Alcohol - Current, 05/19/2010      Current, 05/19/2010     Tobacco - Current, 05/19/2010      Current, 05/19/2010  Family History    Cerebrovascular accident: Father.    Mother: History is negative  Immunizations      Vaccine Date Status      influenza virus vaccine, inactivated 10/28/2014 Given      influenza virus vaccine, inactivated 10/09/2013 Given      tetanus/diphth/pertuss (Tdap) adult/adol 03/17/2009 Recorded